# Patient Record
Sex: MALE
[De-identification: names, ages, dates, MRNs, and addresses within clinical notes are randomized per-mention and may not be internally consistent; named-entity substitution may affect disease eponyms.]

---

## 2018-06-20 ENCOUNTER — HOSPITAL ENCOUNTER (OUTPATIENT)
Dept: HOSPITAL 88 - US | Age: 33
End: 2018-06-20
Attending: FAMILY MEDICINE
Payer: COMMERCIAL

## 2018-06-20 DIAGNOSIS — Z87.442: Primary | ICD-10-CM

## 2018-06-20 PROCEDURE — 76705 ECHO EXAM OF ABDOMEN: CPT

## 2018-06-20 PROCEDURE — 76770 US EXAM ABDO BACK WALL COMP: CPT

## 2018-06-20 NOTE — DIAGNOSTIC IMAGING REPORT
PROCEDURE:US LIVER

 

COMPARISON:None.

 

INDICATIONS:HX OF NEPHROLITHIASIS, FLANK PAIN

 

FINDINGS:

Limited evaluation secondary to overlying bowel gas. 

 

LIVER:

Size:15.5 cm in the right midclavicular line, normal

Appearance:Increased echogenicity, smooth contour

Mass:No focal masses

 

GALLBLADDER:

Stones/Sludge:None

Appearance:No wall thickening, pericholecystic fluid or hydrops. Trace 

fatty sparing adjacent to the gallbladder. 

Sonographic Arnold's Sign:Negative



BILE DUCTS:

Intrahepatic Ducts:No dilation

Extrahepatic Ducts:Common bile duct measures 0.4 cm, no dilatation.

 

PANCREAS:

Not visualized secondary to overlying bowel gas. 

 

RIGHT KIDNEY:

Please refer to same day renal ultrasound for renal findings. 

 

VESSELS:

Aorta:Visualized portions are normal.

Inferior Vena Cava:Visualized portions are normal.

Main Portal Vein:0.9 cm, normal size with hepatopetal flow.

 

FREE FLUID:

No ascites or pleural effusions.

 

CONCLUSION:

Hepatic steatosis. 

Please refer to same day US retroperitoneum for renal findings. 

 

 

 

Dictated by:  Miki Rodriguez M.D. on 6/20/2018 at 14:03     

Electronically approved by:  Miki Rodriguez M.D. on 6/20/2018 at 14:03

## 2018-06-20 NOTE — DIAGNOSTIC IMAGING REPORT
PROCEDURE:US RETROPERITONEAL ( KIDNEY ).

COMPARISON:None.

INDICATIONS:HX OF NEPHROLITHIASIS, FLANK PAIN

TECHNIQUE: Grey-scale and color sonographic images of the bilateral 

kidneys and bladder where obtained in transverse and longitudinal 

planes.

 

FINDINGS:

 

RIGHT KIDNEY: 11.1 x 6.7 x 5.5 cm, cortex 2.6 cm

Cysts: None

Solid masses: None

Stones: Interpolar region 0.7 x 0.4 x 0.5 cm echogenic focus does not 

demonstrate posterior acoustic shadowing or twinkle artifact. 

Hydronephrosis: None

Echogenicity: Normal

 

LEFT KIDNEY: 10.6 x 5.9 x 5.4 cm, cortex 2.4 cm

Cysts: None

Solid masses: None

Stones: None

Hydronephrosis: None

Echogenicity: Normal

 

Bladder: Normal. Both ureteral jets visualized. 

 

CONCLUSION:

1. Punctate echogenic focus in the right kidney may represent a renal 

calculus or prominent focus of renal sinus fat. 

2. Otherwise, unremarkable ultrasound of the kidneys and bladder. 

 

 

Dictated by:  Miki Rodriguez M.D. on 6/20/2018 at 14:08     

Electronically approved by:  Miki Rodriguez M.D. on 6/20/2018 at 14:08